# Patient Record
Sex: FEMALE | Race: WHITE | URBAN - METROPOLITAN AREA
[De-identification: names, ages, dates, MRNs, and addresses within clinical notes are randomized per-mention and may not be internally consistent; named-entity substitution may affect disease eponyms.]

---

## 2023-06-15 ENCOUNTER — HOSPITAL ENCOUNTER (OUTPATIENT)
Age: 63
Discharge: HOME | End: 2023-06-15
Payer: COMMERCIAL

## 2023-06-15 VITALS
DIASTOLIC BLOOD PRESSURE: 80 MMHG | RESPIRATION RATE: 18 BRPM | TEMPERATURE: 98 F | OXYGEN SATURATION: 100 % | SYSTOLIC BLOOD PRESSURE: 172 MMHG | HEART RATE: 80 BPM

## 2023-06-15 DIAGNOSIS — I10 HYPERTENSION, UNSPECIFIED TYPE: Primary | ICD-10-CM

## 2023-06-15 PROCEDURE — 99202 OFFICE O/P NEW SF 15 MIN: CPT | Performed by: NURSE PRACTITIONER

## 2023-06-15 RX ORDER — MIRTAZAPINE 30 MG/1
30 TABLET, FILM COATED ORAL NIGHTLY
COMMUNITY

## 2023-06-15 RX ORDER — BUPROPION HYDROCHLORIDE 300 MG/1
300 TABLET ORAL DAILY
COMMUNITY
Start: 2023-04-07

## 2023-06-15 RX ORDER — BREXPIPRAZOLE 1 MG/1
1 TABLET ORAL DAILY
COMMUNITY
Start: 2023-06-02

## 2023-06-15 NOTE — ED INITIAL ASSESSMENT (HPI)
Pt states her mom checked her blood pressure and it was high. States then went to [facility] and used machine to check her blood pressure and received warning her blood pressure was high. [facility] readings were 197/111, 174/116, 181/103. States started on mirtazapine 30mg 2 weeks ago. States having inc stress due to filing for bankruptcy. No chest pain. No SOB. No headache.

## 2023-06-15 NOTE — DISCHARGE INSTRUCTIONS
You were seen in immediate care today with a concern for elevated blood pressure. You were given a booklet with suggestions for primary care physician. Please call and establish care for further evaluation of your blood pressure. If you develop high blood pressure at home and you have symptoms like headache, dizziness, slurred speech or any other worrisome symptom please go to the emergency room.

## 2023-12-06 ENCOUNTER — HOSPITAL ENCOUNTER (OUTPATIENT)
Age: 63
Discharge: HOME | End: 2023-12-06
Payer: COMMERCIAL

## 2023-12-06 VITALS
OXYGEN SATURATION: 98 % | TEMPERATURE: 98 F | SYSTOLIC BLOOD PRESSURE: 139 MMHG | HEART RATE: 85 BPM | RESPIRATION RATE: 16 BRPM | DIASTOLIC BLOOD PRESSURE: 67 MMHG

## 2023-12-06 DIAGNOSIS — N39.0 URINARY TRACT INFECTION WITH HEMATURIA, SITE UNSPECIFIED: Primary | ICD-10-CM

## 2023-12-06 DIAGNOSIS — R31.9 URINARY TRACT INFECTION WITH HEMATURIA, SITE UNSPECIFIED: Primary | ICD-10-CM

## 2023-12-06 LAB
GLUCOSE UR STRIP-MCNC: NEGATIVE MG/DL
NITRITE UR QL STRIP: NEGATIVE
PH UR STRIP: 6 [PH]
PROT UR STRIP-MCNC: >=300 MG/DL
SP GR UR STRIP: >=1.03
UROBILINOGEN UR STRIP-ACNC: <2 MG/DL

## 2023-12-06 PROCEDURE — 81002 URINALYSIS NONAUTO W/O SCOPE: CPT | Performed by: PHYSICIAN ASSISTANT

## 2023-12-06 PROCEDURE — 99213 OFFICE O/P EST LOW 20 MIN: CPT | Performed by: PHYSICIAN ASSISTANT

## 2023-12-06 PROCEDURE — 87086 URINE CULTURE/COLONY COUNT: CPT | Performed by: PHYSICIAN ASSISTANT

## 2023-12-06 RX ORDER — CEPHALEXIN 500 MG/1
500 CAPSULE ORAL 3 TIMES DAILY
Qty: 21 CAPSULE | Refills: 0 | Status: SHIPPED | OUTPATIENT
Start: 2023-12-06 | End: 2023-12-13

## 2023-12-06 NOTE — ED INITIAL ASSESSMENT (HPI)
Patient is here with urgency in urination, blood in her urine and feeling like she has to go but can go only a little.

## 2023-12-17 ENCOUNTER — HOSPITAL ENCOUNTER (OUTPATIENT)
Age: 63
Discharge: HOME | End: 2023-12-17
Attending: EMERGENCY MEDICINE
Payer: COMMERCIAL

## 2023-12-17 VITALS
RESPIRATION RATE: 19 BRPM | HEART RATE: 80 BPM | SYSTOLIC BLOOD PRESSURE: 130 MMHG | DIASTOLIC BLOOD PRESSURE: 69 MMHG | OXYGEN SATURATION: 96 % | TEMPERATURE: 99 F

## 2023-12-17 DIAGNOSIS — J41.8 MIXED SIMPLE AND MUCOPURULENT CHRONIC BRONCHITIS (HCC): Primary | ICD-10-CM

## 2023-12-17 LAB
POCT INFLUENZA A: NEGATIVE
POCT INFLUENZA B: NEGATIVE
S PYO AG THROAT QL IA.RAPID: NEGATIVE

## 2023-12-17 PROCEDURE — 99213 OFFICE O/P EST LOW 20 MIN: CPT

## 2023-12-17 PROCEDURE — 87502 INFLUENZA DNA AMP PROBE: CPT | Performed by: EMERGENCY MEDICINE

## 2023-12-17 PROCEDURE — 87651 STREP A DNA AMP PROBE: CPT | Performed by: EMERGENCY MEDICINE

## 2023-12-17 RX ORDER — BENZONATATE 100 MG/1
100 CAPSULE ORAL 3 TIMES DAILY PRN
Qty: 30 CAPSULE | Refills: 0 | Status: SHIPPED | OUTPATIENT
Start: 2023-12-17

## 2023-12-17 RX ORDER — AZITHROMYCIN 250 MG/1
TABLET, FILM COATED ORAL
Qty: 6 TABLET | Refills: 0 | Status: SHIPPED | OUTPATIENT
Start: 2023-12-17 | End: 2023-12-21 | Stop reason: ALTCHOICE

## 2023-12-17 NOTE — ED INITIAL ASSESSMENT (HPI)
Patient with cough / congestion and body aches without fever since Thursday  Patient with negative home covid test

## 2023-12-19 ENCOUNTER — APPOINTMENT (OUTPATIENT)
Dept: GENERAL RADIOLOGY | Age: 63
End: 2023-12-19
Attending: NURSE PRACTITIONER
Payer: COMMERCIAL

## 2023-12-19 ENCOUNTER — HOSPITAL ENCOUNTER (OUTPATIENT)
Age: 63
Discharge: HOME | End: 2023-12-19
Payer: COMMERCIAL

## 2023-12-19 VITALS
SYSTOLIC BLOOD PRESSURE: 135 MMHG | DIASTOLIC BLOOD PRESSURE: 74 MMHG | OXYGEN SATURATION: 98 % | HEART RATE: 71 BPM | TEMPERATURE: 98 F | RESPIRATION RATE: 20 BRPM

## 2023-12-19 DIAGNOSIS — S63.259A DISLOCATION OF FINGER, INITIAL ENCOUNTER: Primary | ICD-10-CM

## 2023-12-19 DIAGNOSIS — S62.647A CLOSED NONDISPLACED FRACTURE OF PROXIMAL PHALANX OF LEFT LITTLE FINGER, INITIAL ENCOUNTER: ICD-10-CM

## 2023-12-19 PROCEDURE — 73140 X-RAY EXAM OF FINGER(S): CPT | Performed by: NURSE PRACTITIONER

## 2023-12-19 PROCEDURE — A9150 MISC/EXPER NON-PRESCRIPT DRU: HCPCS | Performed by: NURSE PRACTITIONER

## 2023-12-19 PROCEDURE — 26770 TREAT FINGER DISLOCATION: CPT | Performed by: NURSE PRACTITIONER

## 2023-12-19 PROCEDURE — 99213 OFFICE O/P EST LOW 20 MIN: CPT | Performed by: NURSE PRACTITIONER

## 2023-12-19 PROCEDURE — A4570 SPLINT: HCPCS | Performed by: NURSE PRACTITIONER

## 2023-12-19 RX ORDER — LIDOCAINE HYDROCHLORIDE 10 MG/ML
5 INJECTION, SOLUTION EPIDURAL; INFILTRATION; INTRACAUDAL; PERINEURAL ONCE
Status: COMPLETED | OUTPATIENT
Start: 2023-12-19 | End: 2023-12-19

## 2023-12-19 RX ORDER — ACETAMINOPHEN 500 MG
1000 TABLET ORAL ONCE
Status: COMPLETED | OUTPATIENT
Start: 2023-12-19 | End: 2023-12-19

## 2023-12-19 NOTE — DISCHARGE INSTRUCTIONS
Rest from exacerbating activities for the next week. Apply ice/cold compress for 20min at a time 4-6x daily for the next 2-3 days. Keep the left hand elevated when resting as much as possible. Wear the finger splint at all times unless you are showering/bathing as the splint cannot get wet as it can get moldy. You may take Motrin every 6 hours as needed for pain. Take this with food. If additional pain control is needed, you may also take Tylenol every 6 hours. Follow up with your primary provider or the hand specialist provided in your discharge instructions in the next 2-3 days. Seek additional care in the ER for new or worsening symptoms, fever or increasing pain.

## 2023-12-19 NOTE — ED INITIAL ASSESSMENT (HPI)
Pt states she jammed her L 5th finger accidentally into wall 1 hr ago. Positive swelling noted. Positive CMS.

## 2023-12-21 ENCOUNTER — OFFICE VISIT (OUTPATIENT)
Dept: SURGERY | Facility: CLINIC | Age: 63
End: 2023-12-21
Payer: COMMERCIAL

## 2023-12-21 ENCOUNTER — OFFICE VISIT (OUTPATIENT)
Dept: SURGERY | Facility: CLINIC | Age: 63
End: 2023-12-21

## 2023-12-21 DIAGNOSIS — S62.627A CLOSED DISPLACED FRACTURE OF MIDDLE PHALANX OF LEFT LITTLE FINGER, INITIAL ENCOUNTER: Primary | ICD-10-CM

## 2023-12-21 PROCEDURE — 29130 APPL FINGER SPLINT STATIC: CPT | Performed by: OCCUPATIONAL THERAPIST

## 2023-12-21 NOTE — PROGRESS NOTES
Subjective: I hurt my LSF. Objective:     Current level of performance:  ADL: Independent  Work: N/A  Leisure: N/A    Measurements/Tests:  ROM:      N/A            Treatment Provided this day: Fabricated a LSF extension splint per order. Treatment Time: 20 minutes      Summary/Analysis of Treatment session: Tolerated the fabrication of a LSF extension splint well. Plan: To be compliant with the wear and care of LSF extension splint x 4 weeks.       Follow up in:  x 1 week          David Burton OTR/L
PAIN/fever/CHILLS

## 2023-12-21 NOTE — H&P
Louisa Larios is a 61year old female that presents with   Chief Complaint   Patient presents with    Injury     LSF   . REFERRED BY:  Hieu Sotomayor    Pacemaker: No  Latex Allergy: no  Coumadin: No  Plavix: No  Other anticoagulants: No  Diet medication: No  Cardiac stents: No    HAND DOMINANCE:  Right    Profession: /    RECONSTRUCTIVE HISTORY    SUN EXPOSURE   Current no   Past no   Sunburns no   Tanning salons current no   Tanning salons past no     SKIN CANCER    Personal history of skin cancer: none      HPI:       Injury 1: LSF PIP dislocation, reduced, displaced volar lip fracture  - Date: 12/19/23  - Days Since: 3     80-year-old female right-hand-dominant with an L SF PIP fracture dislocation    Jammed it into a wall when falling    Immediate care of dislocation anatomically reduced  Splinted    Moderate pain            Review of Systems:   Constitutional: No change in appetite, chill/rigors, or fatigue  GI: No jaundice  Endocrine: No generalized weakness  Neurological: No aphasia, loss of consciousness, or seizures    Musculoskeletal:      Date of injury 12/19/23     Location left      small finger      Mechanism Jammed into wall attempting to break a fall  UC reduced dislocation     Treatment Seen in immediate care on 12/19/23, x-ray performed, splinted       Pain  Yes 4/10      Numbness no      PMH:     MEDICAL  Past Medical History:   Diagnosis Date    Appendicitis     Depression         SURGICAL  Past Surgical History:   Procedure Laterality Date    APPENDECTOMY      CYST REMOVAL Left     arm    HYSTERECTOMY      KNEE CARTILAGE SURGERY Left         ALLERIGIES  Allergies   Allergen Reactions    Uncaria Tomentosa, Cats Claw HIVES and OTHER (SEE COMMENTS)     ITCHY EYES        MEDICATIONS  Current Outpatient Medications   Medication Sig Dispense Refill    buPROPion  MG Oral Tablet 24 Hr Take 1 tablet (300 mg total) by mouth daily.       Sertraline HCl 100 MG Oral Tab Take 1 tablet (100 mg total) by mouth daily. 3    TraZODone HCl 50 MG Oral Tab Take 1 tablet (50 mg total) by mouth nightly. benzonatate (TESSALON PERLES) 100 MG Oral Cap Take 1 capsule (100 mg total) by mouth 3 (three) times daily as needed for cough. (Patient not taking: Reported on 12/21/2023) 30 capsule 0        SOCIAL HISTORY  Social History     Socioeconomic History    Marital status:    Tobacco Use    Smoking status: Never    Smokeless tobacco: Never   Vaping Use    Vaping Use: Never used   Substance and Sexual Activity    Alcohol use: No     Alcohol/week: 0.0 standard drinks of alcohol    Drug use: No   Other Topics Concern    Right Handed Yes        FAMILY HISTORY  Family History   Problem Relation Age of Onset    Other (Other[other]) Father         mesothelioma          PHYSICAL EXAM:     CONSTITUTIONAL: Overall appearance - Normal  HEENT: Normocephalic  EYES: Conjunctiva - Right: Normal, Left: Normal; EOMI  EARS: Inspection - Right: Normal, Left: Normal  NECK/THYROID: Inspection - Normal, Palpation - Normal, Thyroid gland - Normal, No adenopathy  RESPIRATORY: Inspection - Normal, Effort - Normal  CARDIOVASCULAR: Regular rhythm, No murmurs  ABDOMEN: Inspection - Normal, No abdominal tenderness  NEURO: Memory intact  PSYCH: Oriented to person, place, time, and situation, Appropriate mood and affect      Hand Physical Exam:     LSF ecchymosis and edema with limited flexion  FDS, FDP, central slip, terminal slip intact  PIP/DIP   RCL/UCL intact  Volar plate stable    X-ray independently interpreted:  Complete dorsal dislocation PIP  Postreduction film anatomic reduction of joint surface, displaced volar lip fracture    ASSESSMENT/PLAN:     Fracture: L SF PIP volar lip, minimally displaced  Post dorsal PIP dislocation, reduced    We discussed the fracture/dislocation and the treatment options. Questions were answered and the patient wishes to proceed with treatment.      INTRA-ARTICULAR FRACTURE/DISLOCATION - We discussed the seriousness of a fracture in the joint. Even with satisfactory treatment, there is an increased chance of stiffness, limited function, and of developing arthritis in the joint. PIP INJURY - We discussed the seriousness of a PIP injury. Even with satisfactory treatment, there is a significant chance of substantial stiffness, limited function, weakness, and of developing arthritis in the joint. Subsequent surgery is a strong possibility. SPLINT - This fracture can be treated with a splint. We discussed splint care and instructions, as well as restrictions. If there is displacement of the fracture, surgery may be required. Finger extension splint with early protected motion    We discussed restrictions. Even with satisfactory healing, the hand/digit may not regain normal ROM or normal function.       1 week start active range of motion  3 weeks start resistance and strengthening  4 weeks discontinue splint  5 weeks unrestricted activity and work, 1/25/2024 12/21/2023  Kathryn Watts MD      +++++++++++++++++++++++++++++++++++++++++      MEDICAL DECISION MAKING    PROBLEMS      MODERATE    (number / complexity)          Acute complicated injury    DATA         MODERATE    (amount / complexity)          X-rays independently reviewed    MANAGEMENT RISK  LOW    (complications/ morbidity)       Splint/OT                  MDM LEVEL    MODERATE

## 2023-12-28 ENCOUNTER — APPOINTMENT (OUTPATIENT)
Dept: SURGERY | Facility: CLINIC | Age: 63
End: 2023-12-28
Payer: COMMERCIAL

## 2023-12-28 DIAGNOSIS — M25.642 STIFFNESS OF JOINT, HAND, LEFT: ICD-10-CM

## 2023-12-28 DIAGNOSIS — M62.81 DISTAL MUSCLE WEAKNESS: Primary | ICD-10-CM

## 2023-12-28 PROCEDURE — 97166 OT EVAL MOD COMPLEX 45 MIN: CPT | Performed by: OCCUPATIONAL THERAPIST

## 2024-01-17 ENCOUNTER — OFFICE VISIT (OUTPATIENT)
Dept: SURGERY | Facility: CLINIC | Age: 64
End: 2024-01-17
Payer: COMMERCIAL

## 2024-01-17 DIAGNOSIS — M25.642 STIFFNESS OF JOINT, HAND, LEFT: ICD-10-CM

## 2024-01-17 DIAGNOSIS — M62.81 DISTAL MUSCLE WEAKNESS: Primary | ICD-10-CM

## 2024-01-17 NOTE — PROGRESS NOTES
Subjective: My LSF is not moving very well.      Objective:     Current level of performance:  ADL: Independent  Work: 2 # lifting restriction with the left hand.  Leisure: Not addressed    Measurements/Tests:  ROM:  Testing By: ani        MP Small Left: 80  PIP Small Left: 60  DIP Small Left: 10  Edema Small Left: 150 degrees of HELLER:      Treatment Provided this day: Initiated strengthening with theraputty, reviewed blocking and passive exercises: x 20 reps each set, x 5 sets each exercise daily    Treatment Time: N/C      Summary/Analysis of Treatment session: Limited active as well as passive range of motion of the LSF.      Plan: Full functional use of the left hand in x 3 weeks:      Follow up in:  01/25/2024          Juan Suarez  OTR/L

## 2024-01-25 ENCOUNTER — OFFICE VISIT (OUTPATIENT)
Dept: SURGERY | Facility: CLINIC | Age: 64
End: 2024-01-25

## 2024-01-25 ENCOUNTER — HOSPITAL ENCOUNTER (OUTPATIENT)
Dept: GENERAL RADIOLOGY | Facility: HOSPITAL | Age: 64
Discharge: HOME | End: 2024-01-25
Attending: PLASTIC SURGERY
Payer: COMMERCIAL

## 2024-01-25 ENCOUNTER — OFFICE VISIT (OUTPATIENT)
Dept: SURGERY | Facility: CLINIC | Age: 64
End: 2024-01-25
Payer: COMMERCIAL

## 2024-01-25 DIAGNOSIS — M62.81 DISTAL MUSCLE WEAKNESS: Primary | ICD-10-CM

## 2024-01-25 DIAGNOSIS — M25.642 STIFFNESS OF JOINT, HAND, LEFT: ICD-10-CM

## 2024-01-25 DIAGNOSIS — S62.627A CLOSED DISPLACED FRACTURE OF MIDDLE PHALANX OF LEFT LITTLE FINGER, INITIAL ENCOUNTER: ICD-10-CM

## 2024-01-25 DIAGNOSIS — S62.627A CLOSED DISPLACED FRACTURE OF MIDDLE PHALANX OF LEFT LITTLE FINGER, INITIAL ENCOUNTER: Primary | ICD-10-CM

## 2024-01-25 PROCEDURE — 97110 THERAPEUTIC EXERCISES: CPT | Performed by: OCCUPATIONAL THERAPIST

## 2024-01-25 PROCEDURE — 73140 X-RAY EXAM OF FINGER(S): CPT | Performed by: PLASTIC SURGERY

## 2024-01-25 PROCEDURE — 99214 OFFICE O/P EST MOD 30 MIN: CPT | Performed by: PLASTIC SURGERY

## 2024-01-25 NOTE — PROGRESS NOTES
Injury 1: LSF PIP dislocation, reduced, displaced volar lip fracture  - Date: 12/19/23  - Days Since: 37    37 days postinjury  Still stiff      Dislocation stable  MP 90  PIP 60  DIP 30    Strength 45 versus 65        X-ray independently interpreted: Fracture position improved  Diffuse IP arthritis      Unrestricted activity  She has joint stiffness secondary to arthritis which is limiting her motion    Continue OT  Include paraffin and other modalities      +++++++++++++++++++++++++++++++++++++++++      MEDICAL DECISION MAKING    PROBLEMS      MODERATE    (number / complexity)          Acute complicated injury    DATA         MODERATE    (amount / complexity)          X-rays independently reviewed    MANAGEMENT RISK  LOW    (complications/ morbidity)       Splint/OT                  MDM LEVEL    MODERATE

## 2024-01-25 NOTE — PROGRESS NOTES
Subjective: My LSF is sore.      Objective:     Current level of performance:  ADL: Independent  Work: [employer].   Leisure: Family    Measurements/Tests:  ROM:  Testing By: ani   Strength Right: 65 #     MP Small Left: 90  PIP Small Left: 60  DIP Small Left: 30  Edema Small Left: 180 HELLER   Strength Left: 45 #      Treatment Provided this day: Up dated LSF objective information: Physician follow-up.    Treatment Time: 20 minutes      Summary/Analysis of Treatment session: Patient fordkd benefit from continued OT services at this tme.      Plan: Full use of the left hand in x 1 month.      Follow up in:  01/29/2024          Juan Suarez  OTR/L

## 2024-01-29 ENCOUNTER — OFFICE VISIT (OUTPATIENT)
Dept: SURGERY | Facility: CLINIC | Age: 64
End: 2024-01-29

## 2024-01-29 DIAGNOSIS — M25.642 STIFFNESS OF JOINT, HAND, LEFT: ICD-10-CM

## 2024-01-29 DIAGNOSIS — M62.81 DISTAL MUSCLE WEAKNESS: Primary | ICD-10-CM

## 2024-01-29 PROCEDURE — 97110 THERAPEUTIC EXERCISES: CPT | Performed by: OCCUPATIONAL THERAPIST

## 2024-01-29 PROCEDURE — 97018 PARAFFIN BATH THERAPY: CPT | Performed by: OCCUPATIONAL THERAPIST

## 2024-01-29 NOTE — PROGRESS NOTES
Subjective: I am feeling a little better.      Objective:     Current level of performance:  ADL: Independent  Work: No restrictions  Leisure: Family    Measurements/Tests:  ROM:         N/A         Treatment Provided this day: Paraffin bath to the left wrist and hand: Paraffin bath x 10 minutes to the involved hand, to increase joint mobility, for increased ease of motion, as well as reduction of pain, so as to increase the benefit of therapeutic exercises and strengthening. Coban flexion wraps of the LSF: Reviewed home intervention with coban flexion wraps, as well as HEP.    Treatment Time: 30 minutes      Summary/Analysis of Treatment session: Progressing well with OT goals and objectives.      Plan: Full use of the left hand in x 3 weeks:      Follow up in:  x 1 week          Juan Suarez  OTR/L

## 2024-02-05 ENCOUNTER — OFFICE VISIT (OUTPATIENT)
Dept: SURGERY | Facility: CLINIC | Age: 64
End: 2024-02-05

## 2024-02-05 DIAGNOSIS — M62.81 DISTAL MUSCLE WEAKNESS: Primary | ICD-10-CM

## 2024-02-05 PROCEDURE — 97035 APP MDLTY 1+ULTRASOUND EA 15: CPT | Performed by: OCCUPATIONAL THERAPIST

## 2024-02-05 PROCEDURE — 97110 THERAPEUTIC EXERCISES: CPT | Performed by: OCCUPATIONAL THERAPIST

## 2024-02-05 NOTE — PROGRESS NOTES
Subjective: My LSF is moving better.      Objective:     Current level of performance:  ADL: Independent  Work: No restrictions  Leisure: Family    Measurements/Tests:  ROM:         N/A         Treatment Provided this day: Paraffin bath to the left wrist and hand: Paraffin bath x 10 minutes to the involved hand, to increase joint mobility, for increased ease of motion, as well as reduction of pain, so as to increase the benefit of therapeutic exercises and strengthening. Therapeutic exercise regime: Reviewed coban Flexion wraps of the LSF: Reviewed active and passive as well as blocking exercise of the LSF x 20 reps per set, x 5 sets daily:  HEP    Treatment Time: 30 minutes      Summary/Analysis of Treatment session: Progressing well with OT goals and objectives.      Plan: Full use of the left hand in x 3 weeks:      Follow up in:  02/22/2024: ( Patient is going out of town )          Juan Suarez  OTR/L

## 2024-05-17 ENCOUNTER — HOSPITAL ENCOUNTER (OUTPATIENT)
Age: 64
Discharge: HOME | End: 2024-05-17

## 2024-05-17 ENCOUNTER — APPOINTMENT (OUTPATIENT)
Dept: GENERAL RADIOLOGY | Age: 64
End: 2024-05-17
Attending: NURSE PRACTITIONER

## 2024-05-17 VITALS
SYSTOLIC BLOOD PRESSURE: 154 MMHG | HEART RATE: 81 BPM | TEMPERATURE: 99 F | RESPIRATION RATE: 16 BRPM | OXYGEN SATURATION: 100 % | DIASTOLIC BLOOD PRESSURE: 73 MMHG

## 2024-05-17 DIAGNOSIS — S69.90XA WRIST INJURY: ICD-10-CM

## 2024-05-17 DIAGNOSIS — S62.101A CLOSED FRACTURE OF RIGHT WRIST, INITIAL ENCOUNTER: Primary | ICD-10-CM

## 2024-05-17 PROCEDURE — 73110 X-RAY EXAM OF WRIST: CPT | Performed by: NURSE PRACTITIONER

## 2024-05-17 PROCEDURE — 99213 OFFICE O/P EST LOW 20 MIN: CPT | Performed by: NURSE PRACTITIONER

## 2024-05-17 PROCEDURE — 29125 APPL SHORT ARM SPLINT STATIC: CPT | Performed by: NURSE PRACTITIONER

## 2024-05-17 PROCEDURE — A4565 SLINGS: HCPCS | Performed by: NURSE PRACTITIONER

## 2024-05-17 NOTE — DISCHARGE INSTRUCTIONS
As we discussed this is a fracture of your wrist.  Wear the splint, do not get it wet or remove it unless your fingers turn blue you have numbness and tingling in your fingers in that case you would rewrap the splint a little looser.  Take Tylenol and/or ibuprofen as needed for pain.  Elevate the hand whenever possible to reduce the swelling for the first few days.  You may place cold packs directly over the splint which will also help with pain and swelling.  You have decided to call your primary care provider and ask for their choice of orthopedic specialist to follow-up with.  Be sure to get an appointment with the orthopedic specialist of your choice next week.  If any significant worsening of the pain, weakness, or other concern return here.   Thank you for choosing our [facility] for your medical condition today. Please be sure to follow up with your primary care provider in 2 days if no improvement, or as directed. If any worsening of your symptoms or other concerns call your primary care provider, return here or go to the emergency department.

## 2024-05-17 NOTE — ED PROVIDER NOTES
Chief Complaint   Patient presents with    Wrist Pain       HPI:     Keturah Rapp is a 63 year old female who presents with a chief complaint over the distal right radius and ulna after a FOOSH injury this afternoon.  Patient is right-hand dominant.  She denies any other injury.  Pain increases with movement.  She denies any numbness, or paresthesias.  She denies prior injury to the right wrist.  She denies pain in the right hand, digits, elbow or shoulder.      PFSH    PFSH asessment screens reviewed and agree.  Nurses notes reviewed I agree with documentation.    Family History   Problem Relation Age of Onset    Other (Other[other]) Father         mesothelioma     Family history is reviewed and is as noted in HPI.  Social History     Socioeconomic History    Marital status:      Spouse name: Not on file    Number of children: Not on file    Years of education: Not on file    Highest education level: Not on file   Occupational History    Not on file   Tobacco Use    Smoking status: Never    Smokeless tobacco: Never   Vaping Use    Vaping status: Never Used   Substance and Sexual Activity    Alcohol use: No     Alcohol/week: 0.0 standard drinks of alcohol    Drug use: No    Sexual activity: Not on file   Other Topics Concern    Left Handed Not Asked    Right Handed Yes    Currently spends a great deal of time in the sun Not Asked    Past Sunlamp Treatments for Acne Not Asked    History of tanning Not Asked    Hx of Spending Great Deal of Time in Sun Not Asked    Bad sunburns in the past Not Asked    Tanning Salons in the Past Not Asked    Hx of Radiation Treatments Not Asked    Regular use of sun block Not Asked   Social History Narrative    Not on file     Social Determinants of Health     Financial Resource Strain: Not on file   Food Insecurity: Not on file   Transportation Needs: Not on file   Physical Activity: Not on file   Stress: Not on file   Social Connections: Not on file   Housing Stability: At  Risk (8/18/2023)    [facility]     Living Situation: Not on file     Housing Problems: Not on file         ROS:   Positive for stated complaint: Right wrist pain  All other systems reviewed and negative except as noted above.  Constitutional and Vital Signs Reviewed.      Physical Exam:     Findings:    /73   Pulse 81   Temp 98.7 °F (37.1 °C) (Temporal)   Resp 16   SpO2 100%   GENERAL: well developed, well nourished, well hydrated, no distress  SKIN: good skin turgor, no obvious rashes, mild erythema over the distal right forearm.  No break in the skin integrity.  CARDIO: RRR right radial pulse 2+, distal right upper extremity cap refill brisk  EXTREMITIES:  R WRIST: Tender to palpation over both the distal ulna and radius.  Pain with supination.  Pain with flexion and ulnar deviation.  Swelling noted distal ulna and radius.  No pain with palpation over navicular or carpal bones.  Has full approximation of thumb to each of the digits.  Full range of motion of right shoulder and right hand.  No sensory deficit HEAD: normocephalic, atraumatic, no facial asymmetry  EYES: bilateral sclera non icteric, no conjunctival injection or discharge, no periorbital swelling  LUNGS: No signs of increased WOB  NEURO: No observed focal deficits, speech clear  PSYCH: Alert and oriented x3.  Answering questions appropriately.  Mood appropriate.    MDM/Assessment/Plan:   Orders for this encounter:    Orders Placed This Encounter    XR WRIST COMPLETE (MIN 3 VIEWS), RIGHT (CPT=73110)     Order Specific Question:   What is the Relevant Clinical Indication / Reason for Exam?     Answer:   Arm Pain     Order Specific Question:   Release to patient     Answer:   Immediate    Short arm splint     Right wrist fracture    Sling       Labs performed this visit:  No results found for this or any previous visit (from the past 10 hour(s)).    MDM:  Differential diagnosis includes fracture of  radius and/or ulna, contusion, sprain.  Will get x-rays and reevaluate.The x-ray shows Acute minimally impacted intra-articular fracture of the distal radius.  A short arm splint was applied with directions for care at home and a sling provided to the patient.  Following application of the splint distal CMS is intact.  Discussed follow-up with orthopedic surgeon.  Patient states she will contact her primary care provider either tomorrow or on Monday for a referral to an orthopedic provider of his choice.       Counseled: Patient, regarding diagnosis, regarding treatment plan, regarding diagnostic results, regarding prescription, I have discussed with the patient the results of tests, differential diagnosis, and warning signs and symptoms that should prompt immediate return. The patient understands these instructions and agrees to the follow-up plan provided. There is no barriers to learning. Appropriate f/u given. Emergency precautions given. Patient agrees to return for any concerns/ problems/complications.      Diagnosis:    ICD-10-CM    1. Closed fracture of right wrist, initial encounter  S62.101A       2. Wrist injury  S69.90XA XR WRIST COMPLETE (MIN 3 VIEWS), RIGHT (CPT=73110)     XR WRIST COMPLETE (MIN 3 VIEWS), RIGHT (CPT=73110)          All results reviewed and discussed with patient.  See AVS for detailed discharge instructions for your condition today.    Follow Up with:  Edgar Carrion MD  [address] [place] [id number] [phone]    Call in 1 day

## 2024-05-17 NOTE — ED INITIAL ASSESSMENT (HPI)
Right wrist pain after tripping and falling and catching self with right hand. Patient has bruising, swelling, and pain. Denies hitting head or taking blood thinners.

## 2025-06-19 ENCOUNTER — HOSPITAL ENCOUNTER (OUTPATIENT)
Age: 65
Discharge: HOME | End: 2025-06-19
Payer: COMMERCIAL

## 2025-06-19 VITALS
HEART RATE: 73 BPM | DIASTOLIC BLOOD PRESSURE: 65 MMHG | SYSTOLIC BLOOD PRESSURE: 134 MMHG | OXYGEN SATURATION: 98 % | RESPIRATION RATE: 18 BRPM | TEMPERATURE: 99 F

## 2025-06-19 DIAGNOSIS — N30.01 ACUTE CYSTITIS WITH HEMATURIA: Primary | ICD-10-CM

## 2025-06-19 LAB
BILIRUB UR QL STRIP: NEGATIVE
COLOR UR: YELLOW
GLUCOSE UR STRIP-MCNC: NEGATIVE MG/DL
NITRITE UR QL STRIP: NEGATIVE
PH UR STRIP: 6 [PH]
PROT UR STRIP-MCNC: 30 MG/DL
SP GR UR STRIP: >=1.03
UROBILINOGEN UR STRIP-ACNC: <2 MG/DL

## 2025-06-19 PROCEDURE — 99214 OFFICE O/P EST MOD 30 MIN: CPT

## 2025-06-19 PROCEDURE — 81002 URINALYSIS NONAUTO W/O SCOPE: CPT

## 2025-06-19 PROCEDURE — 87086 URINE CULTURE/COLONY COUNT: CPT | Performed by: NURSE PRACTITIONER

## 2025-06-19 RX ORDER — CEFADROXIL 500 MG/1
500 CAPSULE ORAL 2 TIMES DAILY
Qty: 14 CAPSULE | Refills: 0 | Status: SHIPPED | OUTPATIENT
Start: 2025-06-19 | End: 2025-06-26

## 2025-06-19 NOTE — ED PROVIDER NOTES
Chief Complaint   Patient presents with    Urinary Symptoms       HPI:     Keturah Rapp is a 64 year old female who presents with a chief complaint of dysuria, urinary frequency, urinary urgency, since yesterday.  No fever.  No flank pain or abdominal pain.  No vaginal discharge or STI concerns.    PFSH  PFSH asessment screens reviewed and agree.  Nursing note reviewed and I agree with documentation.    Family History[1]  Family history reviewed with patient/caregiver and is not pertinent to presenting problem.  Social History     Socioeconomic History    Marital status:      Spouse name: Not on file    Number of children: Not on file    Years of education: Not on file    Highest education level: Not on file   Occupational History    Not on file   Tobacco Use    Smoking status: Never    Smokeless tobacco: Never   Vaping Use    Vaping status: Never Used   Substance and Sexual Activity    Alcohol use: No     Alcohol/week: 0.0 standard drinks of alcohol    Drug use: No    Sexual activity: Not on file   Other Topics Concern    Left Handed Not Asked    Right Handed Yes    Currently spends a great deal of time in the sun Not Asked    Past Sunlamp Treatments for Acne Not Asked    History of tanning Not Asked    Hx of Spending Great Deal of Time in Sun Not Asked    Bad sunburns in the past Not Asked    Tanning Salons in the Past Not Asked    Hx of Radiation Treatments Not Asked    Regular use of sun block Not Asked   Social History Narrative    Not on file     Social Drivers of Health     Food Insecurity: Not on file   Transportation Needs: Not on file   Housing Stability: At Risk (8/18/2023)    Received from [facility]     Living Situation: Not on file     Housing Problems: Not on file           ROS:   Positive for stated complaint:   All other systems reviewed and negative except as noted above.  Constitutional and Vital Signs Reviewed.      Physical Exam:     /65   Pulse 73   Temp 98.7 °F  (37.1 °C) (Oral)   Resp 18   SpO2 98%   GENERAL: well developed, well nourished, well hydrated, no distress  BACK: CVA tenderness: Bilaterally, No  GI: soft, non-tender, without masses or organomegaly  EXTREMITIES: no cyanosis or edema. BELL without difficulty  SKIN: good skin turgor, no rashes      MDM/Assessment/Plan:   Orders for this encounter:    Orders Placed This Encounter    POCT Urinalysis Dipstick    Urine Culture, Routine     Specimen source::   Urine, clean catch     Documentation of symptoms of UTI or sepsis::   Documentation of symptoms of UTI or sepsis must be corroborated within the EMR     Release to patient:   Immediate    POCT Urine Dip    cefadroxil 500 MG Oral Cap     Sig: Take 1 capsule (500 mg total) by mouth 2 (two) times daily for 7 days.     Dispense:  14 capsule     Refill:  0       Labs performed this visit:  Recent Results (from the past 10 hours)   POCT Urinalysis Dipstick    Collection Time: 06/19/25  4:53 PM   Result Value Ref Range    Urine Color Yellow Yellow    Urine Clarity Cloudy (A) Clear    Specific Gravity, Urine >=1.030 1.005 - 1.030    PH, Urine 6.0 5.0 - 8.0    Protein urine 30 (A) Negative mg/dL    Glucose, Urine Negative Negative mg/dL    Ketone, Urine Trace (A) Negative mg/dL    Bilirubin, Urine Negative Negative    Blood, Urine Large (A) Negative    Nitrite Urine Negative Negative    Urobilinogen urine <2.0 <2.0 mg/dL    Leukocyte esterase urine Trace (A) Negative       MDM:   Medical Decision Making  Differentials: acute cystitis vs pyelonephritis vs obstructive stone vs STI vs vaginitis vs other     HPI and exam consistent with acute cystitis.  Patient is afebrile, no CVAT on exam, no colicky flank pain, low suspicion for pyelonephritis versus obstructive stone.  Patient has no STI concerns today.  No unusual vaginal discharge.  Will start cefadroxil today.  ER precautions were discussed in detail.  Patient was advised to follow-up with primary care provider if no  improvement after antibiotic.  Patient verbalized understanding and agreeable to plan of care.       Amount and/or Complexity of Data Reviewed  Labs: ordered. Decision-making details documented in ED Course.     Details: Urine dip consistent with infection  Urine culture pending     Risk  Prescription drug management.        Diagnosis:    ICD-10-CM    1. Acute cystitis with hematuria  N30.01           All results reviewed and discussed with patient.  See AVS for detailed discharge instructions for your condition today.    Follow Up with:  No follow-up provider specified.           [1]   Family History  Problem Relation Age of Onset    Other (Other[other]) Father         mesothelioma